# Patient Record
Sex: FEMALE | Race: OTHER
[De-identification: names, ages, dates, MRNs, and addresses within clinical notes are randomized per-mention and may not be internally consistent; named-entity substitution may affect disease eponyms.]

---

## 2020-09-21 ENCOUNTER — HOSPITAL ENCOUNTER (INPATIENT)
Dept: HOSPITAL 54 - ER | Age: 35
LOS: 3 days | Discharge: HOME | DRG: 341 | End: 2020-09-24
Attending: NURSE PRACTITIONER | Admitting: NURSE PRACTITIONER
Payer: COMMERCIAL

## 2020-09-21 VITALS — BODY MASS INDEX: 33.84 KG/M2 | WEIGHT: 191 LBS | HEIGHT: 63 IN

## 2020-09-21 VITALS — SYSTOLIC BLOOD PRESSURE: 117 MMHG | DIASTOLIC BLOOD PRESSURE: 65 MMHG

## 2020-09-21 DIAGNOSIS — K35.80: Primary | ICD-10-CM

## 2020-09-21 DIAGNOSIS — A41.9: ICD-10-CM

## 2020-09-21 DIAGNOSIS — E66.01: ICD-10-CM

## 2020-09-21 DIAGNOSIS — K76.0: ICD-10-CM

## 2020-09-21 LAB
ALBUMIN SERPL BCP-MCNC: 4.1 G/DL (ref 3.4–5)
ALBUMIN SERPL BCP-MCNC: 4.1 G/DL (ref 3.4–5)
ALP SERPL-CCNC: 52 U/L (ref 46–116)
ALP SERPL-CCNC: 52 U/L (ref 46–116)
ALT SERPL W P-5'-P-CCNC: 29 U/L (ref 12–78)
ALT SERPL W P-5'-P-CCNC: 29 U/L (ref 12–78)
AST SERPL W P-5'-P-CCNC: 25 U/L (ref 15–37)
AST SERPL W P-5'-P-CCNC: 27 U/L (ref 15–37)
BASOPHILS # BLD AUTO: 0 /CMM (ref 0–0.2)
BASOPHILS NFR BLD AUTO: 0.2 % (ref 0–2)
BILIRUB DIRECT SERPL-MCNC: 0.1 MG/DL (ref 0–0.2)
BILIRUB DIRECT SERPL-MCNC: 0.1 MG/DL (ref 0–0.2)
BILIRUB SERPL-MCNC: 0.3 MG/DL (ref 0.2–1)
BILIRUB SERPL-MCNC: 0.3 MG/DL (ref 0.2–1)
BUN SERPL-MCNC: 8 MG/DL (ref 7–18)
CALCIUM SERPL-MCNC: 9.4 MG/DL (ref 8.5–10.1)
CHLORIDE SERPL-SCNC: 100 MMOL/L (ref 98–107)
CO2 SERPL-SCNC: 25 MMOL/L (ref 21–32)
CREAT SERPL-MCNC: 0.9 MG/DL (ref 0.6–1.3)
EOSINOPHIL NFR BLD AUTO: 0.6 % (ref 0–6)
GLUCOSE SERPL-MCNC: 106 MG/DL (ref 74–106)
HCT VFR BLD AUTO: 43 % (ref 33–45)
HGB BLD-MCNC: 14 G/DL (ref 11.5–14.8)
KETONES UR STRIP-MCNC: 15 MG/DL
LIPASE SERPL-CCNC: 97 U/L (ref 73–393)
LYMPHOCYTES NFR BLD AUTO: 16.2 % (ref 20–44)
LYMPHOCYTES NFR BLD AUTO: 2.7 /CMM (ref 0.8–4.8)
MCHC RBC AUTO-ENTMCNC: 33 G/DL (ref 31–36)
MCV RBC AUTO: 88 FL (ref 82–100)
MONOCYTES NFR BLD AUTO: 1 /CMM (ref 0.1–1.3)
MONOCYTES NFR BLD AUTO: 6 % (ref 2–12)
NEUTROPHILS # BLD AUTO: 12.6 /CMM (ref 1.8–8.9)
NEUTROPHILS NFR BLD AUTO: 77 % (ref 43–81)
PH UR STRIP: 6 [PH] (ref 5–8)
PLATELET # BLD AUTO: 349 /CMM (ref 150–450)
POTASSIUM SERPL-SCNC: 3.8 MMOL/L (ref 3.5–5.1)
PROT SERPL-MCNC: 9.5 G/DL (ref 6.4–8.2)
PROT SERPL-MCNC: 9.5 G/DL (ref 6.4–8.2)
RBC # BLD AUTO: 4.93 MIL/UL (ref 4–5.2)
RBC #/AREA URNS HPF: (no result) /HPF (ref 0–2)
SODIUM SERPL-SCNC: 137 MMOL/L (ref 136–145)
UROBILINOGEN UR STRIP-MCNC: 0.2 EU/DL
WBC #/AREA URNS HPF: (no result) /HPF (ref 0–3)
WBC NRBC COR # BLD AUTO: 16.4 K/UL (ref 4.3–11)

## 2020-09-21 PROCEDURE — C9113 INJ PANTOPRAZOLE SODIUM, VIA: HCPCS

## 2020-09-21 PROCEDURE — G0378 HOSPITAL OBSERVATION PER HR: HCPCS

## 2020-09-21 NOTE — NUR
MS RN OPEN NOTES



RECEIVED PT VIA WHEELCHAIR, WAS ABLE TO AMBULATE TO BED. A/O X4. STABLE ON RA, NO SOB/ ACUTE 
RESPIRATORY DISTRESS NOTED. IV IN L AC #20G IS PATENT AND INTACT. PT ORIENTED TO ROOM. BED 
IS IN LOWEST LOCKED POSITION WITH SIDE RAILS UP X2, SEMI FOWLERS. CALL LIGHT IS WITHIN 
REACH. WILL CONTINUE TO MONITOR. 

VITALS ON ADMISSION: /65     HR 73   O2 99%   TEMP 98.4    RR 20

## 2020-09-22 VITALS — SYSTOLIC BLOOD PRESSURE: 119 MMHG | DIASTOLIC BLOOD PRESSURE: 65 MMHG

## 2020-09-22 VITALS — DIASTOLIC BLOOD PRESSURE: 68 MMHG | SYSTOLIC BLOOD PRESSURE: 118 MMHG

## 2020-09-22 VITALS — SYSTOLIC BLOOD PRESSURE: 103 MMHG | DIASTOLIC BLOOD PRESSURE: 52 MMHG

## 2020-09-22 VITALS — DIASTOLIC BLOOD PRESSURE: 61 MMHG | SYSTOLIC BLOOD PRESSURE: 99 MMHG

## 2020-09-22 LAB
BASOPHILS # BLD AUTO: 0 /CMM (ref 0–0.2)
BASOPHILS NFR BLD AUTO: 0.3 % (ref 0–2)
BUN SERPL-MCNC: 7 MG/DL (ref 7–18)
CALCIUM SERPL-MCNC: 8.2 MG/DL (ref 8.5–10.1)
CHLORIDE SERPL-SCNC: 107 MMOL/L (ref 98–107)
CHOLEST SERPL-MCNC: 109 MG/DL (ref ?–200)
CO2 SERPL-SCNC: 22 MMOL/L (ref 21–32)
CREAT SERPL-MCNC: 0.9 MG/DL (ref 0.6–1.3)
EOSINOPHIL NFR BLD AUTO: 1.9 % (ref 0–6)
GLUCOSE SERPL-MCNC: 94 MG/DL (ref 74–106)
HCT VFR BLD AUTO: 41 % (ref 33–45)
HDLC SERPL-MCNC: 40 MG/DL (ref 40–60)
HGB BLD-MCNC: 12.9 G/DL (ref 11.5–14.8)
LDLC SERPL DIRECT ASSAY-MCNC: 63 MG/DL (ref 0–99)
LYMPHOCYTES NFR BLD AUTO: 26.7 % (ref 20–44)
LYMPHOCYTES NFR BLD AUTO: 3.2 /CMM (ref 0.8–4.8)
MAGNESIUM SERPL-MCNC: 2.7 MG/DL (ref 1.8–2.4)
MCHC RBC AUTO-ENTMCNC: 32 G/DL (ref 31–36)
MCV RBC AUTO: 89 FL (ref 82–100)
MONOCYTES NFR BLD AUTO: 1.3 /CMM (ref 0.1–1.3)
MONOCYTES NFR BLD AUTO: 11.2 % (ref 2–12)
NEUTROPHILS # BLD AUTO: 7.1 /CMM (ref 1.8–8.9)
NEUTROPHILS NFR BLD AUTO: 59.9 % (ref 43–81)
PHOSPHATE SERPL-MCNC: 3 MG/DL (ref 2.5–4.9)
PLATELET # BLD AUTO: 258 /CMM (ref 150–450)
POTASSIUM SERPL-SCNC: 3.6 MMOL/L (ref 3.5–5.1)
RBC # BLD AUTO: 4.57 MIL/UL (ref 4–5.2)
SODIUM SERPL-SCNC: 139 MMOL/L (ref 136–145)
TRIGL SERPL-MCNC: 36 MG/DL (ref 30–150)
TSH SERPL DL<=0.005 MIU/L-ACNC: 3.17 UIU/ML (ref 0.36–3.74)
WBC NRBC COR # BLD AUTO: 11.9 K/UL (ref 4.3–11)

## 2020-09-22 PROCEDURE — 0DTJ4ZZ RESECTION OF APPENDIX, PERCUTANEOUS ENDOSCOPIC APPROACH: ICD-10-PCS | Performed by: SURGERY

## 2020-09-22 RX ADMIN — SODIUM CHLORIDE, SODIUM LACTATE, POTASSIUM CHLORIDE, AND CALCIUM CHLORIDE PRN MLS/HR: .6; .31; .03; .02 INJECTION, SOLUTION INTRAVENOUS at 01:11

## 2020-09-22 RX ADMIN — SODIUM CHLORIDE SCH MG: 9 INJECTION, SOLUTION INTRAVENOUS at 09:29

## 2020-09-22 RX ADMIN — SODIUM CHLORIDE, SODIUM LACTATE, POTASSIUM CHLORIDE, AND CALCIUM CHLORIDE PRN MLS/HR: .6; .31; .03; .02 INJECTION, SOLUTION INTRAVENOUS at 22:31

## 2020-09-22 RX ADMIN — PIPERACILLIN SODIUM AND TAZOBACTAM SODIUM SCH MLS/HR: .375; 3 INJECTION, POWDER, LYOPHILIZED, FOR SOLUTION INTRAVENOUS at 10:15

## 2020-09-22 RX ADMIN — PIPERACILLIN SODIUM AND TAZOBACTAM SODIUM SCH MLS/HR: .375; 3 INJECTION, POWDER, LYOPHILIZED, FOR SOLUTION INTRAVENOUS at 17:05

## 2020-09-22 NOTE — NUR
MS RN CLOSE NOTES





PATIENT IS RESTING IN BED. A/O X4. ON RA, NO SOB/ ACUTE RESPIRATORY DISTRESS NOTED. IV IN L 
AC#20G IS PATENT AND INTACT RUNNING KCL @75MLS/HR. PATIENT KEPT NPO THROUGH THE NIGHT. 
APPEARS COMFORTABLE/ DENIES ANY PAIN AT THE MOMENT. PATIENT IS ABLE TO AMBULATE. BED IS IN 
LOWEST LOCKED POSITION WITH SIDE RAILS UP X2, SEMI FOWLERS. CALL LIGHT IS WITHIN REACH. WILL 
ENDORSE TO AM NURSE.

## 2020-09-22 NOTE — NUR
PATIENT RETURN FROM PACU. VS TEMPERATURE 97.8, HR 61, RESPIRATIONS 17, /56, 100% 
OXYGEN ON 2L OXYGEN VIA NASAL CANULA. NO ACUTE DISTRESS NOTED. PATIENT REPORTED NO PAIN, 
JUST "A LITTLE SLEEPY"

PATIENT DVT PUMPS ON, INCENTIVE SPIROMETER AT BEDSIDE, HEAD OF BED ELEVATED

PATIENT SAFETY MEASURES MAINTAINED. CALL LIGHT WITHIN REACH. WILL CONTINUE TO MONITOR.

## 2020-09-22 NOTE — NUR
PATIENT TAKEN TO OR FOR LAPAROSCOPIC APPENDECTOMY POSSIBLE OPEN. PATIENT IN STABLE 
CONDITION. CONSENTS SIGNED, CHECKLIST DONE, VITAL SIGNS TAKEN AND DOCUMENTED.

## 2020-09-22 NOTE — NUR
MS RN OPENING NOTES



RECEIVED PATIENT IN BED, AWAKE, CONSCIOUS, COOPERATIVE, A/O X4, BREATHING AT ROOM AIR, 
UNLABORED BREATHING, NO SIGNS OF RESPIRATORY DISTRESS, LAC #20G, KCL @ 75ML/HR ON HOLD, 
ANTIBIOTICS STILL ONGOING, NO COMPLAINTS OF PAIN, SIDE RAILS UP X 2, WILL CONTINUE TO 
MONITOR.

## 2020-09-22 NOTE — NUR
RECEIVED PATIENT IN BED. NO ACUTE DISTRESS NOTED. ALERT & ORIENTED X4. PATIENT ON ROOM AIR, 
BREATHING EVEN AND UNLABORED, SATURATING WELL. PATIENT NPO STATUS OBSERVED. PATIENT LEFT 
ANTECUBITAL IV ACCESS INTACT, PATENT, FLUSHED WELL. PATIENT SAFETY MEASURES MAINTAINED. CALL 
LIGHT WITHIN REACH. WILL CONTINUE TO MONITOR CLOSELY.

## 2020-09-22 NOTE — NUR
PATIENT IN BED. NO ACUTE DISTRESS NOTED. ALERT & ORIENTED X4. PATIENT ON ROOM AIR, BREATHING 
EVEN AND UNLABORED, SATURATING WELL. PATIENT LEFT ANTECUBITAL IV ACCESS INTACT, PATENT, 
FLUSHED WELL. PATIENT SAFETY MEASURES MAINTAINED. CALL LIGHT WITHIN REACH. WILL ENDORSE PLAN 
OF CARE TO ONCOMING SHIFT FOR CONTINUITY OF CARE

## 2020-09-23 VITALS — DIASTOLIC BLOOD PRESSURE: 67 MMHG | SYSTOLIC BLOOD PRESSURE: 111 MMHG

## 2020-09-23 VITALS — SYSTOLIC BLOOD PRESSURE: 124 MMHG | DIASTOLIC BLOOD PRESSURE: 65 MMHG

## 2020-09-23 VITALS — DIASTOLIC BLOOD PRESSURE: 65 MMHG | SYSTOLIC BLOOD PRESSURE: 108 MMHG

## 2020-09-23 VITALS — DIASTOLIC BLOOD PRESSURE: 65 MMHG | SYSTOLIC BLOOD PRESSURE: 103 MMHG

## 2020-09-23 LAB
BASOPHILS # BLD AUTO: 0 /CMM (ref 0–0.2)
BASOPHILS NFR BLD AUTO: 0.1 % (ref 0–2)
BUN SERPL-MCNC: 5 MG/DL (ref 7–18)
CALCIUM SERPL-MCNC: 8.6 MG/DL (ref 8.5–10.1)
CHLORIDE SERPL-SCNC: 106 MMOL/L (ref 98–107)
CO2 SERPL-SCNC: 23 MMOL/L (ref 21–32)
CREAT SERPL-MCNC: 1 MG/DL (ref 0.6–1.3)
EOSINOPHIL NFR BLD AUTO: 0.1 % (ref 0–6)
GLUCOSE SERPL-MCNC: 96 MG/DL (ref 74–106)
HCT VFR BLD AUTO: 39 % (ref 33–45)
HGB BLD-MCNC: 12.4 G/DL (ref 11.5–14.8)
LYMPHOCYTES NFR BLD AUTO: 1 /CMM (ref 0.8–4.8)
LYMPHOCYTES NFR BLD AUTO: 5.6 % (ref 20–44)
MAGNESIUM SERPL-MCNC: 2.3 MG/DL (ref 1.8–2.4)
MCHC RBC AUTO-ENTMCNC: 32 G/DL (ref 31–36)
MCV RBC AUTO: 88 FL (ref 82–100)
MONOCYTES NFR BLD AUTO: 0.5 /CMM (ref 0.1–1.3)
MONOCYTES NFR BLD AUTO: 2.9 % (ref 2–12)
NEUTROPHILS # BLD AUTO: 16.2 /CMM (ref 1.8–8.9)
NEUTROPHILS NFR BLD AUTO: 91.3 % (ref 43–81)
PHOSPHATE SERPL-MCNC: 2.8 MG/DL (ref 2.5–4.9)
PLATELET # BLD AUTO: 309 /CMM (ref 150–450)
POTASSIUM SERPL-SCNC: 3.8 MMOL/L (ref 3.5–5.1)
RBC # BLD AUTO: 4.44 MIL/UL (ref 4–5.2)
SODIUM SERPL-SCNC: 140 MMOL/L (ref 136–145)
WBC NRBC COR # BLD AUTO: 17.8 K/UL (ref 4.3–11)

## 2020-09-23 RX ADMIN — PIPERACILLIN SODIUM AND TAZOBACTAM SODIUM SCH MLS/HR: .375; 3 INJECTION, POWDER, LYOPHILIZED, FOR SOLUTION INTRAVENOUS at 17:00

## 2020-09-23 RX ADMIN — SODIUM CHLORIDE, SODIUM LACTATE, POTASSIUM CHLORIDE, AND CALCIUM CHLORIDE PRN MLS/HR: .6; .31; .03; .02 INJECTION, SOLUTION INTRAVENOUS at 18:11

## 2020-09-23 RX ADMIN — PIPERACILLIN SODIUM AND TAZOBACTAM SODIUM SCH MLS/HR: .375; 3 INJECTION, POWDER, LYOPHILIZED, FOR SOLUTION INTRAVENOUS at 11:00

## 2020-09-23 RX ADMIN — SODIUM CHLORIDE SCH MG: 9 INJECTION, SOLUTION INTRAVENOUS at 09:39

## 2020-09-23 RX ADMIN — PIPERACILLIN SODIUM AND TAZOBACTAM SODIUM SCH MLS/HR: .375; 3 INJECTION, POWDER, LYOPHILIZED, FOR SOLUTION INTRAVENOUS at 01:16

## 2020-09-23 NOTE — NUR
DANAE/RN OPENING NOTES 



RECEIVED PATIENT RESTING IN BED. PATIENT IS ALERT AND ORIENTED X 4. NO SIGNS OF RESPIRATORY 
DISTRESS NOTED. PATIENT HAS IV ACCESS ON LAC #20G RUNNING D5 1/2 NS + 20 MEQ KCL AT 75 
ML/HR. PATIENT STATES NO PAIN AT THIS TIME. SAFETY MEASURES ARE IN PLACE, BED IS LOCKED AND 
PLACED IN THE LOW POSITION, SIDE RAILS UP X 2. CALL LIGHT IS WITHIN REACH. WILL CONTINUE TO 
MONITOR DURING SHIFT.

## 2020-09-23 NOTE — NUR
RN OPENING NOTE:



Receievd patient in bed. Awake, alert and oriented x4. Able to make needs known. On room air 
and tolerating well. Saturation @ 98%. S/P appendectomy with surgical site stitches clean, 
dry, patent and intact. Minimal pain reported at the moment and patient prefers to not take 
pain medication at this moment. IV site clean, dry, patent and intact. IV infusion of D5 
1/2/ NS @ 75mls/hr + 20meq KCL being tolerated well. Call light in reach. Bed locked, low 
and at semi-garcia's position. Side rails up x3. Safety ensured and observed. Will continue 
to monitor.

## 2020-09-23 NOTE — NUR
MS RN CLOSING NOTES



ENDORSED PATIENT IN BED, AWAKE, A/O X4, BREATHING AT ROOM AIR, UNLABORED BREATHING, NO SIGNS 
OF RESPIRATORY DISTRESS, LAC #20G, KCL @ 75ML/HR INFUSING WELL, NO REDNESS OR INFILTRATION 
NOTED, NO SIGNS OF BLEEDING ON SURGICAL SITE, NO COMPLAINTS OF PAIN, SIDE RAILS UP X 2, 
ENCOURAGE PATIENT TO AMBULATE.

## 2020-09-23 NOTE — NUR
RN OPENING NOTE:



Patient remains in bed. Awake, alert and oriented x4. Able to make needs known. On room air 
and tolerating well. Saturation @ 98%. S/P appendectomy with surgical site stitches clean, 
dry, patent and intact. Minimal pain reported at the moment and patient prefers to not take 
pain medication at this moment. IV site clean, dry, patent and intact. IV infusion of D5 
1/2/ NS @ 75mls/hr + 20meq KCL being tolerated well. Seen by Lincoln Heredia DNP and Beverly Li DNP earlier on shift. Call light in reach. Bed locked, low and at 
semi-garcia's position. Side rails up x3. Safety ensured and observed. Due medications 
given. Treatment given as ordered. Endorsed to oncoming shift for MIRIAN. 

-------------------------------------------------------------------------------

Addendum: 09/23/20 at 1937 by LISY HAILE RN

-------------------------------------------------------------------------------

RN CLOSING NOTE

## 2020-09-24 VITALS — DIASTOLIC BLOOD PRESSURE: 82 MMHG | SYSTOLIC BLOOD PRESSURE: 128 MMHG

## 2020-09-24 VITALS — DIASTOLIC BLOOD PRESSURE: 68 MMHG | SYSTOLIC BLOOD PRESSURE: 128 MMHG

## 2020-09-24 VITALS — SYSTOLIC BLOOD PRESSURE: 133 MMHG | DIASTOLIC BLOOD PRESSURE: 76 MMHG

## 2020-09-24 VITALS — DIASTOLIC BLOOD PRESSURE: 76 MMHG | SYSTOLIC BLOOD PRESSURE: 133 MMHG

## 2020-09-24 LAB
BASOPHILS # BLD AUTO: 0 /CMM (ref 0–0.2)
BASOPHILS NFR BLD AUTO: 0.2 % (ref 0–2)
BUN SERPL-MCNC: 4 MG/DL (ref 7–18)
CALCIUM SERPL-MCNC: 8 MG/DL (ref 8.5–10.1)
CHLORIDE SERPL-SCNC: 108 MMOL/L (ref 98–107)
CO2 SERPL-SCNC: 25 MMOL/L (ref 21–32)
CREAT SERPL-MCNC: 0.9 MG/DL (ref 0.6–1.3)
EOSINOPHIL NFR BLD AUTO: 1.5 % (ref 0–6)
GLUCOSE SERPL-MCNC: 108 MG/DL (ref 74–106)
HCT VFR BLD AUTO: 34 % (ref 33–45)
HGB BLD-MCNC: 11.1 G/DL (ref 11.5–14.8)
LYMPHOCYTES NFR BLD AUTO: 3.2 /CMM (ref 0.8–4.8)
LYMPHOCYTES NFR BLD AUTO: 31.4 % (ref 20–44)
MAGNESIUM SERPL-MCNC: 2.2 MG/DL (ref 1.8–2.4)
MCHC RBC AUTO-ENTMCNC: 33 G/DL (ref 31–36)
MCV RBC AUTO: 88 FL (ref 82–100)
MONOCYTES NFR BLD AUTO: 0.8 /CMM (ref 0.1–1.3)
MONOCYTES NFR BLD AUTO: 8.2 % (ref 2–12)
NEUTROPHILS # BLD AUTO: 6 /CMM (ref 1.8–8.9)
NEUTROPHILS NFR BLD AUTO: 58.7 % (ref 43–81)
PHOSPHATE SERPL-MCNC: 2.3 MG/DL (ref 2.5–4.9)
PLATELET # BLD AUTO: 243 /CMM (ref 150–450)
POTASSIUM SERPL-SCNC: 3.5 MMOL/L (ref 3.5–5.1)
RBC # BLD AUTO: 3.88 MIL/UL (ref 4–5.2)
SODIUM SERPL-SCNC: 141 MMOL/L (ref 136–145)
WBC NRBC COR # BLD AUTO: 10.2 K/UL (ref 4.3–11)

## 2020-09-24 RX ADMIN — PIPERACILLIN SODIUM AND TAZOBACTAM SODIUM SCH MLS/HR: .375; 3 INJECTION, POWDER, LYOPHILIZED, FOR SOLUTION INTRAVENOUS at 10:28

## 2020-09-24 RX ADMIN — PIPERACILLIN SODIUM AND TAZOBACTAM SODIUM SCH MLS/HR: .375; 3 INJECTION, POWDER, LYOPHILIZED, FOR SOLUTION INTRAVENOUS at 01:17

## 2020-09-24 NOTE — NUR
MS/RN NOTES 



PATIENT WAS COMPLAINING OF PAIN AT ABD. INCISION SITE AND CHEST SORENESS. TORADOL 15 MG IM 
WAS GIVEN. V/S ARE STABLE, WILL CONTINUE TO MONITOR.

## 2020-09-24 NOTE — NUR
MS/RN CLOSING NOTES  



PATIENT SLEEPING IN BED EASY TO WAKE. PATIENT IS ALERT AND ORIENTED X 4. NO SIGNS OF 
RESPIRATORY DISTRESS OR SOB NOTED. PATIENT HAS IV ACCESS ON LAC #20G RUNNING D5 1/2 NS + 20 
MEQ KCL AT 75 ML/HR. PATIENT STATES NO PAIN AT THIS TIME. PATIENTS STITCHES ARE CLEAN, DRY 
AND INTACT, NO SIGNS OF ACTIVE BLEEDING. ALL PATIENTS NEEDS HAVE BEEN MET DURING SHIFT. 
SAFETY MEASURES ARE IN PLACE, BED IS LOCKED AND PLACED IN THE LOW POSITION, SIDE RAILS UP X 
2. CALL LIGHT IS WITHIN REACH. WILL ENDORSE CARE TO DAY SHIFT.

## 2020-09-24 NOTE — NUR
Patient in bed, A/O x4, denies pain or discomfort, on room air, no sob or resp distress 
noted, O2 saturation is 98% at this time, laparoscopic incisions scars are noted no s/sx of 
infection noted, active bowel sounds in four quadrants, flatus present, no BM reported. 
Lungs are clear. IV line on L AC noted, intact and patent, skin is intact. Diet order is 
clear liquids, bed in lowest position, vall light in reach, HOB elevated, will cont to 
monitor

## 2020-09-24 NOTE — NUR
Patient picked up by family member, via car, patient is able to ambulate , IV line removed, 
ID bands removed, discharge instruction provided, patient is clean and safe went home. 
Follow up instructions provided

## 2022-05-02 NOTE — NUR
BIBS FROM HOME TO ER BED 7. AAOX4. NOT IN RESP DISTRESS. AMBULATORY. CAME IN 
FOR MID LOWER ABDOMINAL PAIN SHOOTING DOWN 10/10 SINCE THIS MORNING. PT IS ALSO 
COMPLAINING OF FRONTAL HEADACHE, STATES TAHT SHE FEELS PRESSURE WHEN BENDING 
FORWARD. PT IS NOTED WITH A FEVER .5. MD WAS AT THE BEDSIDE FOR EVAL. 
ORDERS RECEIVED NOTED AND CARRIED OUT. IV LINE ESTABLSHED ON L AC 20G, BLOOD 
DRAWN AND GIVEN TO  AT BEDSIDE. URINE COLLECTED AND SENT TO LAB. Quality 431: Preventive Care And Screening: Unhealthy Alcohol Use - Screening: Patient screened for unhealthy alcohol use using a single question and scores less than 2 times per year Detail Level: Detailed Quality 131: Pain Assessment And Follow-Up: Pain assessment using a standardized tool is documented as negative, no follow-up plan required Quality 110: Preventive Care And Screening: Influenza Immunization: Influenza Immunization previously received during influenza season Quality 130: Documentation Of Current Medications In The Medical Record: Current Medications Documented Quality 226: Preventive Care And Screening: Tobacco Use: Screening And Cessation Intervention: Patient screened for tobacco use and is an ex/non-smoker